# Patient Record
Sex: MALE | Race: OTHER | NOT HISPANIC OR LATINO | URBAN - METROPOLITAN AREA
[De-identification: names, ages, dates, MRNs, and addresses within clinical notes are randomized per-mention and may not be internally consistent; named-entity substitution may affect disease eponyms.]

---

## 2020-03-20 ENCOUNTER — EMERGENCY (EMERGENCY)
Facility: HOSPITAL | Age: 50
LOS: 1 days | Discharge: HOME | End: 2020-03-20
Attending: EMERGENCY MEDICINE
Payer: SELF-PAY

## 2020-03-20 VITALS
DIASTOLIC BLOOD PRESSURE: 77 MMHG | HEART RATE: 53 BPM | RESPIRATION RATE: 16 BRPM | SYSTOLIC BLOOD PRESSURE: 129 MMHG | HEIGHT: 72 IN | OXYGEN SATURATION: 100 % | TEMPERATURE: 98 F

## 2020-03-20 LAB
ALBUMIN SERPL ELPH-MCNC: 4 G/DL — SIGNIFICANT CHANGE UP (ref 3.3–5.2)
ALP SERPL-CCNC: 53 U/L — SIGNIFICANT CHANGE UP (ref 40–120)
ALT FLD-CCNC: 29 U/L — SIGNIFICANT CHANGE UP
ANION GAP SERPL CALC-SCNC: 12 MMOL/L — SIGNIFICANT CHANGE UP (ref 5–17)
AST SERPL-CCNC: 21 U/L — SIGNIFICANT CHANGE UP
BASOPHILS # BLD AUTO: 0.05 K/UL — SIGNIFICANT CHANGE UP (ref 0–0.2)
BASOPHILS NFR BLD AUTO: 0.6 % — SIGNIFICANT CHANGE UP (ref 0–2)
BILIRUB SERPL-MCNC: 0.3 MG/DL — LOW (ref 0.4–2)
BUN SERPL-MCNC: 15 MG/DL — SIGNIFICANT CHANGE UP (ref 8–20)
CALCIUM SERPL-MCNC: 8.8 MG/DL — SIGNIFICANT CHANGE UP (ref 8.6–10.2)
CHLORIDE SERPL-SCNC: 103 MMOL/L — SIGNIFICANT CHANGE UP (ref 98–107)
CO2 SERPL-SCNC: 23 MMOL/L — SIGNIFICANT CHANGE UP (ref 22–29)
CREAT SERPL-MCNC: 0.9 MG/DL — SIGNIFICANT CHANGE UP (ref 0.5–1.3)
EOSINOPHIL # BLD AUTO: 0.16 K/UL — SIGNIFICANT CHANGE UP (ref 0–0.5)
EOSINOPHIL NFR BLD AUTO: 1.9 % — SIGNIFICANT CHANGE UP (ref 0–6)
GLUCOSE SERPL-MCNC: 94 MG/DL — SIGNIFICANT CHANGE UP (ref 70–99)
HCT VFR BLD CALC: 42.1 % — SIGNIFICANT CHANGE UP (ref 39–50)
HGB BLD-MCNC: 13.7 G/DL — SIGNIFICANT CHANGE UP (ref 13–17)
IMM GRANULOCYTES NFR BLD AUTO: 0.2 % — SIGNIFICANT CHANGE UP (ref 0–1.5)
LYMPHOCYTES # BLD AUTO: 2.64 K/UL — SIGNIFICANT CHANGE UP (ref 1–3.3)
LYMPHOCYTES # BLD AUTO: 32 % — SIGNIFICANT CHANGE UP (ref 13–44)
MCHC RBC-ENTMCNC: 32.3 PG — SIGNIFICANT CHANGE UP (ref 27–34)
MCHC RBC-ENTMCNC: 32.5 GM/DL — SIGNIFICANT CHANGE UP (ref 32–36)
MCV RBC AUTO: 99.3 FL — SIGNIFICANT CHANGE UP (ref 80–100)
MONOCYTES # BLD AUTO: 0.93 K/UL — HIGH (ref 0–0.9)
MONOCYTES NFR BLD AUTO: 11.3 % — SIGNIFICANT CHANGE UP (ref 2–14)
NEUTROPHILS # BLD AUTO: 4.44 K/UL — SIGNIFICANT CHANGE UP (ref 1.8–7.4)
NEUTROPHILS NFR BLD AUTO: 54 % — SIGNIFICANT CHANGE UP (ref 43–77)
PLATELET # BLD AUTO: 364 K/UL — SIGNIFICANT CHANGE UP (ref 150–400)
POTASSIUM SERPL-MCNC: 4.4 MMOL/L — SIGNIFICANT CHANGE UP (ref 3.5–5.3)
POTASSIUM SERPL-SCNC: 4.4 MMOL/L — SIGNIFICANT CHANGE UP (ref 3.5–5.3)
PROT SERPL-MCNC: 6.6 G/DL — SIGNIFICANT CHANGE UP (ref 6.6–8.7)
RBC # BLD: 4.24 M/UL — SIGNIFICANT CHANGE UP (ref 4.2–5.8)
RBC # FLD: 12.9 % — SIGNIFICANT CHANGE UP (ref 10.3–14.5)
SODIUM SERPL-SCNC: 138 MMOL/L — SIGNIFICANT CHANGE UP (ref 135–145)
TROPONIN T SERPL-MCNC: <0.01 NG/ML — SIGNIFICANT CHANGE UP (ref 0–0.06)
WBC # BLD: 8.24 K/UL — SIGNIFICANT CHANGE UP (ref 3.8–10.5)
WBC # FLD AUTO: 8.24 K/UL — SIGNIFICANT CHANGE UP (ref 3.8–10.5)

## 2020-03-20 PROCEDURE — 99283 EMERGENCY DEPT VISIT LOW MDM: CPT | Mod: 25

## 2020-03-20 PROCEDURE — 71045 X-RAY EXAM CHEST 1 VIEW: CPT | Mod: 26

## 2020-03-20 PROCEDURE — 80053 COMPREHEN METABOLIC PANEL: CPT

## 2020-03-20 PROCEDURE — 85027 COMPLETE CBC AUTOMATED: CPT

## 2020-03-20 PROCEDURE — 36415 COLL VENOUS BLD VENIPUNCTURE: CPT

## 2020-03-20 PROCEDURE — U0001: CPT

## 2020-03-20 PROCEDURE — 71045 X-RAY EXAM CHEST 1 VIEW: CPT

## 2020-03-20 PROCEDURE — 84484 ASSAY OF TROPONIN QUANT: CPT

## 2020-03-20 PROCEDURE — 99285 EMERGENCY DEPT VISIT HI MDM: CPT

## 2020-03-20 NOTE — ED PROVIDER NOTE - CLINICAL SUMMARY MEDICAL DECISION MAKING FREE TEXT BOX
pt is well appearing with very atypical chest pain, only present upon coughing, likely msk due to the coughing. EKG with poor R wave progression but no acute ischemia, stable for dc and cardio f/u

## 2020-03-20 NOTE — ED PROVIDER NOTE - CARE PROVIDER_API CALL
Flaco Nunez)  Cardiovascular Disease  [address], [place] [address]  Phone: [phone]  Fax: [phone]  Follow Up Time:

## 2020-03-20 NOTE — ED PROVIDER NOTE - OBJECTIVE STATEMENT
49 year old male with no significant PMH presents with cough and chest pain. pt states that his Sx started 2 days ago as a dry cough, constant, no radiation. Pt states that upon coughing he gets a sharp pain in his chest, not related to position or deep inspiration. Pt reports mild nasal congestion but he denies sore throat, fevers, SOB, LE edema, hemoptysis

## 2020-03-20 NOTE — ED ADULT TRIAGE NOTE - CHIEF COMPLAINT QUOTE
sent from [facility]; cough/cold symptoms/reproducible midsternal chest pain x 2 days. denies cardiac hx.

## 2020-03-20 NOTE — ED PROVIDER NOTE - PATIENT PORTAL LINK FT
You can access the FollowMyHealth Patient Portal offered by [facility] by registering at the following website: [email or web address]. By joining [employer]’s FollowMyHealth portal, you will also be able to view your health information using other applications (apps) compatible with our system.